# Patient Record
Sex: MALE | Race: WHITE | NOT HISPANIC OR LATINO | Employment: FULL TIME | URBAN - METROPOLITAN AREA
[De-identification: names, ages, dates, MRNs, and addresses within clinical notes are randomized per-mention and may not be internally consistent; named-entity substitution may affect disease eponyms.]

---

## 2023-03-12 ENCOUNTER — HOSPITAL ENCOUNTER (EMERGENCY)
Facility: HOSPITAL | Age: 33
Discharge: HOME | End: 2023-03-13
Attending: EMERGENCY MEDICINE | Admitting: EMERGENCY MEDICINE

## 2023-03-12 ENCOUNTER — APPOINTMENT (EMERGENCY)
Dept: RADIOLOGY | Facility: HOSPITAL | Age: 33
End: 2023-03-12

## 2023-03-12 DIAGNOSIS — F41.9 ANXIETY: Primary | ICD-10-CM

## 2023-03-12 DIAGNOSIS — F45.8 ACUTE HYPERVENTILATION SYNDROME: ICD-10-CM

## 2023-03-12 DIAGNOSIS — E87.6 HYPOKALEMIA: ICD-10-CM

## 2023-03-12 LAB
ANION GAP SERPL CALCULATED.3IONS-SCNC: 14 MMOL/L (ref 4–13)
BASOPHILS # BLD AUTO: 0.06 THOUSANDS/ÂΜL (ref 0–0.1)
BASOPHILS NFR BLD AUTO: 0 % (ref 0–1)
BUN SERPL-MCNC: 15 MG/DL (ref 5–25)
CALCIUM SERPL-MCNC: 9.3 MG/DL (ref 8.4–10.2)
CHLORIDE SERPL-SCNC: 94 MMOL/L (ref 96–108)
CO2 SERPL-SCNC: 21 MMOL/L (ref 21–32)
CREAT SERPL-MCNC: 0.91 MG/DL (ref 0.6–1.3)
EOSINOPHIL # BLD AUTO: 0.05 THOUSAND/ÂΜL (ref 0–0.61)
EOSINOPHIL NFR BLD AUTO: 0 % (ref 0–6)
ERYTHROCYTE [DISTWIDTH] IN BLOOD BY AUTOMATED COUNT: 11.7 % (ref 11.6–15.1)
GFR SERPL CREATININE-BSD FRML MDRD: 111 ML/MIN/1.73SQ M
GLUCOSE SERPL-MCNC: 137 MG/DL (ref 65–140)
HCT VFR BLD AUTO: 38.3 % (ref 36.5–49.3)
HGB BLD-MCNC: 13.7 G/DL (ref 12–17)
IMM GRANULOCYTES # BLD AUTO: 0.09 THOUSAND/UL (ref 0–0.2)
IMM GRANULOCYTES NFR BLD AUTO: 1 % (ref 0–2)
LYMPHOCYTES # BLD AUTO: 1.78 THOUSANDS/ÂΜL (ref 0.6–4.47)
LYMPHOCYTES NFR BLD AUTO: 12 % (ref 14–44)
MCH RBC QN AUTO: 30.4 PG (ref 26.8–34.3)
MCHC RBC AUTO-ENTMCNC: 35.8 G/DL (ref 31.4–37.4)
MCV RBC AUTO: 85 FL (ref 82–98)
MONOCYTES # BLD AUTO: 0.68 THOUSAND/ÂΜL (ref 0.17–1.22)
MONOCYTES NFR BLD AUTO: 5 % (ref 4–12)
NEUTROPHILS # BLD AUTO: 11.82 THOUSANDS/ÂΜL (ref 1.85–7.62)
NEUTS SEG NFR BLD AUTO: 82 % (ref 43–75)
NRBC BLD AUTO-RTO: 0 /100 WBCS
PLATELET # BLD AUTO: 349 THOUSANDS/UL (ref 149–390)
PMV BLD AUTO: 8.8 FL (ref 8.9–12.7)
POTASSIUM SERPL-SCNC: 3 MMOL/L (ref 3.5–5.3)
RBC # BLD AUTO: 4.51 MILLION/UL (ref 3.88–5.62)
SODIUM SERPL-SCNC: 129 MMOL/L (ref 135–147)
WBC # BLD AUTO: 14.48 THOUSAND/UL (ref 4.31–10.16)

## 2023-03-12 RX ORDER — HYDROXYZINE HYDROCHLORIDE 25 MG/1
25 TABLET, FILM COATED ORAL EVERY 6 HOURS PRN
Qty: 12 TABLET | Refills: 0 | Status: SHIPPED | OUTPATIENT
Start: 2023-03-12

## 2023-03-12 RX ORDER — POTASSIUM CHLORIDE 20 MEQ/1
40 TABLET, EXTENDED RELEASE ORAL ONCE
Status: COMPLETED | OUTPATIENT
Start: 2023-03-12 | End: 2023-03-13

## 2023-03-12 RX ORDER — LORAZEPAM 2 MG/ML
1 INJECTION INTRAMUSCULAR ONCE
Status: COMPLETED | OUTPATIENT
Start: 2023-03-12 | End: 2023-03-12

## 2023-03-12 RX ADMIN — LORAZEPAM 1 MG: 2 INJECTION INTRAMUSCULAR; INTRAVENOUS at 22:53

## 2023-03-12 NOTE — Clinical Note
Pedro Arnold was seen and treated in our emergency department on 3/12/2023  No restrictions            Diagnosis:     Media Crank  may return to work on return date  He may return on this date: 03/14/2023         If you have any questions or concerns, please don't hesitate to call        Kemar Tanner MD    ______________________________           _______________          _______________  Hospital Representative                              Date                                Time

## 2023-03-13 VITALS
RESPIRATION RATE: 20 BRPM | TEMPERATURE: 98.1 F | OXYGEN SATURATION: 98 % | DIASTOLIC BLOOD PRESSURE: 70 MMHG | SYSTOLIC BLOOD PRESSURE: 142 MMHG | HEART RATE: 95 BPM

## 2023-03-13 LAB
ATRIAL RATE: 87 BPM
P AXIS: 82 DEGREES
PR INTERVAL: 170 MS
QRS AXIS: 79 DEGREES
QRSD INTERVAL: 96 MS
QT INTERVAL: 350 MS
QTC INTERVAL: 421 MS
T WAVE AXIS: 63 DEGREES
VENTRICULAR RATE: 87 BPM

## 2023-03-13 RX ADMIN — POTASSIUM CHLORIDE 40 MEQ: 1500 TABLET, EXTENDED RELEASE ORAL at 00:16

## 2023-03-13 NOTE — ED PROVIDER NOTES
History  Chief Complaint   Patient presents with   • Anxiety     Pt reports increased anxiety today with palpitations and mild sob  Pt states hx of anxiety but does not take meds at home  Pt also reports mild pain in chest with inspiration, and tingling in arms and legs  Pt reports taking a CBD gummy this morning for anxiety with no relief  Pt tachypneic in triage  57-year-old male presents for evaluation of multiple plaints that started this afternoon  Patient a gradual onset of palpitations, shortness of breath, rapid breathing  Symptoms began gradually, constant, getting progressively worse  He states that he intermittently feels tingliness in his hands and feet and got lightheaded like he was about to pass out  Feels extremely anxious with this  Denies suicidal or homicidal thoughts  No lower extremity or calf pain, no respecters for DVT or PE, no chest pain  Anxiety  Presenting symptoms: no agitation, no hallucinations and no suicidal thoughts    Associated symptoms: anxiety    Associated symptoms: no abdominal pain, no appetite change, no chest pain and no fatigue        None       History reviewed  No pertinent past medical history  History reviewed  No pertinent surgical history  History reviewed  No pertinent family history  I have reviewed and agree with the history as documented  E-Cigarette/Vaping   • E-Cigarette Use Never User      E-Cigarette/Vaping Substances   • Nicotine No    • THC No    • CBD No    • Flavoring No    • Other No    • Unknown No      Social History     Tobacco Use   • Smoking status: Never   • Smokeless tobacco: Never   Vaping Use   • Vaping Use: Never used   Substance Use Topics   • Drug use: Not Currently       Review of Systems   Constitutional: Negative for activity change, appetite change, fatigue and fever  HENT: Negative for congestion, dental problem, ear pain, rhinorrhea and sore throat  Eyes: Negative for pain and redness     Respiratory: Positive for shortness of breath  Negative for chest tightness and wheezing  Cardiovascular: Positive for palpitations  Negative for chest pain  Gastrointestinal: Negative for abdominal pain, blood in stool, constipation, diarrhea, nausea and vomiting  Endocrine: Negative for cold intolerance and heat intolerance  Genitourinary: Negative for dysuria, frequency and hematuria  Musculoskeletal: Negative for arthralgias and myalgias  Skin: Negative for color change, pallor and rash  Neurological: Negative for weakness and numbness  Hematological: Does not bruise/bleed easily  Psychiatric/Behavioral: Negative for agitation, hallucinations and suicidal ideas  The patient is nervous/anxious  The patient is not hyperactive  Physical Exam  Physical Exam  Vitals and nursing note reviewed  Constitutional:       Appearance: Normal appearance  HENT:      Right Ear: External ear normal       Left Ear: External ear normal       Nose: No congestion or rhinorrhea  Mouth/Throat:      Pharynx: No oropharyngeal exudate or posterior oropharyngeal erythema  Eyes:      Extraocular Movements: Extraocular movements intact  Pupils: Pupils are equal, round, and reactive to light  Cardiovascular:      Rate and Rhythm: Normal rate and regular rhythm  Pulmonary:      Effort: Pulmonary effort is normal       Breath sounds: Normal breath sounds  Abdominal:      General: There is no distension  Tenderness: There is no abdominal tenderness  Musculoskeletal:         General: No deformity  Cervical back: Normal range of motion  No rigidity  Right lower leg: No edema  Left lower leg: No edema  Skin:     Capillary Refill: Capillary refill takes less than 2 seconds  Neurological:      General: No focal deficit present  Mental Status: He is alert and oriented to person, place, and time     Psychiatric:         Mood and Affect: Mood normal          Behavior: Behavior normal  Vital Signs  ED Triage Vitals [03/12/23 2219]   Temperature Pulse Respirations Blood Pressure SpO2   98 1 °F (36 7 °C) 83 (!) 24 139/83 100 %      Temp Source Heart Rate Source Patient Position - Orthostatic VS BP Location FiO2 (%)   Oral Monitor Sitting Right arm --      Pain Score       No Pain           Vitals:    03/12/23 2219   BP: 139/83   Pulse: 83   Patient Position - Orthostatic VS: Sitting         Visual Acuity      ED Medications  Medications   potassium chloride (K-DUR,KLOR-CON) CR tablet 40 mEq (has no administration in time range)   LORazepam (ATIVAN) injection 1 mg (1 mg Intravenous Given 3/12/23 2253)       Diagnostic Studies  Results Reviewed     Procedure Component Value Units Date/Time    Basic metabolic panel [[id number]]  (Abnormal) Collected: 03/12/23 2249    Lab Status: Final result Specimen: Blood from Arm, Left Updated: 03/12/23 2319     Sodium 129 mmol/L      Potassium 3 0 mmol/L      Chloride 94 mmol/L      CO2 21 mmol/L      ANION GAP 14 mmol/L      BUN 15 mg/dL      Creatinine 0 91 mg/dL      Glucose 137 mg/dL      Calcium 9 3 mg/dL      eGFR 111 ml/min/1 73sq m     Narrative:      [place] guidelines for Chronic Kidney Disease (CKD):   •  Stage 1 with normal or high GFR (GFR > 90 mL/min/1 73 square meters)  •  Stage 2 Mild CKD (GFR = 60-89 mL/min/1 73 square meters)  •  Stage 3A Moderate CKD (GFR = 45-59 mL/min/1 73 square meters)  •  Stage 3B Moderate CKD (GFR = 30-44 mL/min/1 73 square meters)  •  Stage 4 Severe CKD (GFR = 15-29 mL/min/1 73 square meters)  •  Stage 5 End Stage CKD (GFR <15 mL/min/1 73 square meters)  Note: GFR calculation is accurate only with a steady state creatinine    CBC and differential [[id number]]  (Abnormal) Collected: 03/12/23 2249    Lab Status: Final result Specimen: Blood from Arm, Left Updated: 03/12/23 2259     WBC 14 48 Thousand/uL      RBC 4 51 Million/uL      Hemoglobin 13 7 g/dL      Hematocrit 38 3 %      MCV 85 fL MCH 30 4 pg      MCHC 35 8 g/dL      RDW 11 7 %      MPV 8 8 fL      Platelets 855 Thousands/uL      nRBC 0 /100 WBCs      Neutrophils Relative 82 %      Immat GRANS % 1 %      Lymphocytes Relative 12 %      Monocytes Relative 5 %      Eosinophils Relative 0 %      Basophils Relative 0 %      Neutrophils Absolute 11 82 Thousands/µL      Immature Grans Absolute 0 09 Thousand/uL      Lymphocytes Absolute 1 78 Thousands/µL      Monocytes Absolute 0 68 Thousand/µL      Eosinophils Absolute 0 05 Thousand/µL      Basophils Absolute 0 06 Thousands/µL                  XR chest 2 views   ED Interpretation by Cosmo Pang MD (03/12 2330)   Primary reviewed  No acute normality  Procedures  ECG 12 Lead Documentation Only    Date/Time: 3/12/2023 11:49 PM  Performed by: Cosmo Pang MD  Authorized by: Cosmo Pang MD     ECG reviewed by me, the ED Provider: yes    Patient location:  ED  Rate:     ECG rate:  87    ECG rate assessment: normal    Rhythm:     Rhythm: sinus rhythm    Ectopy:     Ectopy: none    QRS:     QRS axis:  Normal    QRS intervals:  Normal  Conduction:     Conduction: normal    ST segments:     ST segments:  Normal  T waves:     T waves: normal               ED Course  ED Course as of 03/13/23 0016   Sun Mar 12, 2023   2329 Potassium(!): 3 0   2334 Work-up reviewed and benign  Will give patient p o  potassium, discharged home with outpatient follow-up                         PERC Rule for PE    Flowsheet Row Most Recent Value   PERC Rule for PE    Age >=50 0 Filed at: 03/12/2023 2350   HR >=100 0 Filed at: 03/12/2023 2350   O2 Sat on room air < 95% 0 Filed at: 03/12/2023 2350   History of PE or DVT 0 Filed at: 03/12/2023 2350   Recent trauma or surgery 0 Filed at: 03/12/2023 2350   Hemoptysis 0 Filed at: 03/12/2023 2350   Exogenous estrogen 0 Filed at: 03/12/2023 2350   Unilateral leg swelling 0 Filed at: 03/12/2023 2350   PERC Rule for PE Results 0 Filed at: 03/12/2023 2350 SBIRT 20yo+    Flowsheet Row Most Recent Value   SBIRT (23 yo +)    In order to provide better care to our patients, we are screening all of our patients for alcohol and drug use  Would it be okay to ask you these screening questions? Yes Filed at: 03/12/2023 2226   Initial Alcohol Screen: US AUDIT-C     1  How often do you have a drink containing alcohol? 1 Filed at: 03/12/2023 2226   2  How many drinks containing alcohol do you have on a typical day you are drinking? 0 Filed at: 03/12/2023 2226   3a  Male UNDER 65: How often do you have five or more drinks on one occasion? 0 Filed at: 03/12/2023 2226   Audit-C Score 1 Filed at: 03/12/2023 2226   CELESTINE: How many times in the past year have you    Used an illegal drug or used a prescription medication for non-medical reasons? Never Filed at: 03/12/2023 2226                    Medical Decision Making  Multiple complaints most consistent with panic attack  We will do EKG to rule out dysrhythmia, labs for electrolyte normalities, chest x-ray to my/pneumothorax  Patient is PERC negative and does not report of a pulmonary embolism  Amount and/or Complexity of Data Reviewed  Labs: ordered  Decision-making details documented in ED Course  Radiology: ordered and independent interpretation performed  Risk  Prescription drug management            Disposition  Final diagnoses:   Anxiety   Hypokalemia   Acute hyperventilation syndrome     Time reflects when diagnosis was documented in both MDM as applicable and the Disposition within this note     Time User Action Codes Description Comment    3/12/2023 11:34 PM Bonifacio Jesus Add [F41 9] Anxiety     3/12/2023 11:36 PM Roslyn HUERTA Add [E87 6] Hypokalemia     3/12/2023 11:36 PM Bonifacio Jesus Add [F45 8] Acute hyperventilation syndrome       ED Disposition     ED Disposition   Discharge    Condition   Stable    Date/Time   Sun Mar 12, 2023 11:34 PM    Comment   Dmitriy Aguilar discharge to home/self care                Follow-up Information     Follow up With Specialties Details Why Contact Info Additional [facility] Schedule an appointment as soon as possible for a visit in 2 days  [address], [address] [facility] [id number]  [address], [address], [address] [place], [place], [address]          Patient's Medications   Discharge Prescriptions    HYDROXYZINE HCL (ATARAX) 25 MG TABLET    Take 1 tablet (25 mg total) by mouth every 6 (six) hours as needed for anxiety       Start Date: 3/12/2023 End Date: --       Order Dose: 25 mg       Quantity: 12 tablet    Refills: 0       No discharge procedures on file      PDMP Review     None          ED Provider  Electronically Signed by           Jacob Rosales MD  03/13/23 8066

## 2023-03-15 ENCOUNTER — HOSPITAL ENCOUNTER (EMERGENCY)
Facility: HOSPITAL | Age: 33
Discharge: HOME | End: 2023-03-15
Attending: EMERGENCY MEDICINE

## 2023-03-15 VITALS
SYSTOLIC BLOOD PRESSURE: 153 MMHG | HEART RATE: 81 BPM | DIASTOLIC BLOOD PRESSURE: 78 MMHG | WEIGHT: 200.62 LBS | RESPIRATION RATE: 16 BRPM | TEMPERATURE: 98.1 F | OXYGEN SATURATION: 100 %

## 2023-03-15 DIAGNOSIS — F41.9 ANXIETY: Primary | ICD-10-CM

## 2023-03-15 LAB
FLUAV RNA RESP QL NAA+PROBE: NEGATIVE
FLUBV RNA RESP QL NAA+PROBE: NEGATIVE
RSV RNA RESP QL NAA+PROBE: NEGATIVE
SARS-COV-2 RNA RESP QL NAA+PROBE: NEGATIVE

## 2023-03-15 RX ORDER — ALPRAZOLAM 0.5 MG/1
0.5 TABLET ORAL ONCE
Status: COMPLETED | OUTPATIENT
Start: 2023-03-15 | End: 2023-03-15

## 2023-03-15 RX ADMIN — ALPRAZOLAM 0.5 MG: 0.5 TABLET ORAL at 02:45

## 2023-03-15 NOTE — ED NOTES
Pt able to swallow medication and water without difficulty        Charisse Galarza, HALEIGH  03/15/23 3155

## 2023-03-15 NOTE — ED PROVIDER NOTES
History  Chief Complaint   Patient presents with   • Anxiety     Patient reports he awoke feeling anxious and short of breath  Felt like he had difficulty swallowing  Took hydroxyzine earlier around 6 pm      The patient is a 58-year-old male with no past medical history presents to the ED for evaluation after waking up from sleep feeling anxious and short of breath  He also reports feeling shaky upon waking  He reports that he felt as though his mouth was dry, and had difficulty swallowing  He also reports congestion, and is unsure if he had postnasal drip at this time  He was seen in this facility on 3/12 for similar symptoms, during which he was prescribed Atarax  He states that he last took Atarax at 6 PM   Is unsure if he had improvement from it  He denies leg swelling, recent travel, recent surgery, hemoptysis, history of DVT/PE, family history of DVT/PE, chest pain, fever, abdominal pain, nausea, vomiting, diarrhea  Prior to Admission Medications   Prescriptions Last Dose Informant Patient Reported? Taking?   hydrOXYzine HCL (ATARAX) 25 mg tablet   No No   Sig: Take 1 tablet (25 mg total) by mouth every 6 (six) hours as needed for anxiety      Facility-Administered Medications: None       History reviewed  No pertinent past medical history  History reviewed  No pertinent surgical history  History reviewed  No pertinent family history  I have reviewed and agree with the history as documented  E-Cigarette/Vaping   • E-Cigarette Use Never User      E-Cigarette/Vaping Substances   • Nicotine No    • THC No    • CBD No    • Flavoring No    • Other No    • Unknown No      Social History     Tobacco Use   • Smoking status: Never   • Smokeless tobacco: Never   Vaping Use   • Vaping Use: Never used   Substance Use Topics   • Drug use: Not Currently       Review of Systems   Constitutional: Negative for chills and fever  HENT: Positive for congestion, ear pain and trouble swallowing  Negative for rhinorrhea and sore throat  Respiratory: Positive for shortness of breath  Negative for cough  Cardiovascular: Negative for chest pain and leg swelling  Gastrointestinal: Negative for abdominal pain, constipation, diarrhea, nausea and vomiting  Genitourinary: Negative for dysuria and flank pain  Musculoskeletal: Negative for arthralgias and myalgias  Skin: Negative for rash and wound  Neurological: Negative for dizziness, weakness, numbness and headaches  Psychiatric/Behavioral: Negative for behavioral problems  The patient is nervous/anxious  Physical Exam  Physical Exam  Vitals and nursing note reviewed  Constitutional:       General: He is not in acute distress  Appearance: He is well-developed  He is not ill-appearing or toxic-appearing  HENT:      Head: Normocephalic and atraumatic  Right Ear: Tympanic membrane, ear canal and external ear normal       Left Ear: Tympanic membrane, ear canal and external ear normal       Mouth/Throat:      Mouth: Mucous membranes are moist  No angioedema  Pharynx: Oropharynx is clear  Uvula midline  No posterior oropharyngeal erythema or uvula swelling  Eyes:      Conjunctiva/sclera: Conjunctivae normal    Cardiovascular:      Rate and Rhythm: Normal rate and regular rhythm  Heart sounds: No murmur heard  Pulmonary:      Effort: Pulmonary effort is normal  No respiratory distress  Breath sounds: Normal breath sounds  Abdominal:      Palpations: Abdomen is soft  Tenderness: There is no abdominal tenderness  Musculoskeletal:         General: No swelling  Cervical back: Neck supple  Skin:     General: Skin is warm and dry  Capillary Refill: Capillary refill takes less than 2 seconds  Neurological:      Mental Status: He is alert  Sensory: No sensory deficit  Motor: No weakness     Psychiatric:         Mood and Affect: Mood normal          Vital Signs  ED Triage Vitals   Temperature Pulse Respirations Blood Pressure SpO2   03/15/23 0220 03/15/23 0217 03/15/23 0217 03/15/23 0217 03/15/23 0217   98 1 °F (36 7 °C) 81 16 153/78 100 %      Temp Source Heart Rate Source Patient Position - Orthostatic VS BP Location FiO2 (%)   03/15/23 0220 -- -- -- --   Oral          Pain Score       03/15/23 0217       No Pain           Vitals:    03/15/23 0217   BP: 153/78   Pulse: 81         Visual Acuity      ED Medications  Medications   ALPRAZolam (XANAX) tablet 0 5 mg (0 5 mg Oral Given 3/15/23 0245)       Diagnostic Studies  Results Reviewed     Procedure Component Value Units Date/Time    FLU/RSV/COVID - if FLU/RSV clinically relevant [[id number]]  (Normal) Collected: 03/15/23 0243    Lab Status: Final result Specimen: Nares from Nose Updated: 03/15/23 0333     SARS-CoV-2 Negative     INFLUENZA A PCR Negative     INFLUENZA B PCR Negative     RSV PCR Negative    Narrative:      FOR PEDIATRIC PATIENTS - copy/paste COVID Guidelines URL to browser: [email or web address]  [email or web address]    SARS-CoV-2 assay is a Nucleic Acid Amplification assay intended for the  qualitative detection of nucleic acid from SARS-CoV-2 in nasopharyngeal  swabs  Results are for the presumptive identification of SARS-CoV-2 RNA  Positive results are indicative of infection with SARS-CoV-2, the virus  causing COVID-19, but do not rule out bacterial infection or co-infection  with other viruses  Laboratories within the [place] and its  territories are required to report all positive results to the appropriate  public health authorities  Negative results do not preclude SARS-CoV-2  infection and should not be used as the sole basis for treatment or other  patient management decisions  Negative results must be combined with  clinical observations, patient history, and epidemiological information  This test has not been FDA cleared or approved      This test has been authorized by FDA under an Emergency Use Authorization  (EUA)  This test is only authorized for the duration of time the  declaration that circumstances exist justifying the authorization of the  emergency use of an in vitro diagnostic tests for detection of SARS-CoV-2  virus and/or diagnosis of COVID-19 infection under section 564(b)(1) of  the Act, 21 U  S C  303UWN-5(B)(8), unless the authorization is terminated  or revoked sooner  The test has been validated but independent review by FDA  and CLIA is pending  Test performed using [employer] Gene[employer]pert: This RT-PCR assay targets N2,  a region unique to SARS-CoV-2  A conserved region in the E-gene was chosen  for pan-Sarbecovirus detection which includes SARS-CoV-2  According to CMS-2020-01-R, this platform meets the definition of high-throughput technology  No orders to display              Procedures  Procedures         ED Course  ED Course as of 03/16/23 0355   Wed Mar 15, 2023   0334 SARS-COV-2: Negative           Medical Decision Making  The patient is a 40-year-old male presents to the ED for evaluation after waking up from sleep feeling anxious, shakey, and short of breath  On exam, the patient is in no acute distress  He is anxious appearing  VSS  HRRR  Lungs CTA  Strength 5/5 in all extremities, sensation grossly intact  Work-up from previous visit reviewed  Labs within normal limits  Chest x-ray, EKG were noted to be unremarkable  Given previous work-up, will attempt to treat patient prior to additional work-up  Will trial Xanax, obtain COVID test per patient request, and re-examine  On reexamination, patient reports significant improvement in symptoms  Will discharge with PCP follow-up  At the time of discharge, the patient is in no acute distress   I discussed with the patient the diagnosis, treatment plan, follow-up, return precautions, and discharge instructions; they were given the opportunity to ask questions and verbalized understanding  Anxiety: acute illness or injury  Amount and/or Complexity of Data Reviewed  External Data Reviewed: labs, radiology, ECG and notes  Labs: ordered  Decision-making details documented in ED Course  Risk  Prescription drug management  Disposition  Final diagnoses:   Anxiety     Time reflects when diagnosis was documented in both MDM as applicable and the Disposition within this note     Time User Action Codes Description Comment    3/15/2023  3:55 AM Agustin Walden Add [F41 9] Anxiety       ED Disposition     ED Disposition   Discharge    Condition   Stable    Date/Time   Wed Mar 15, 2023  3:55 AM    Comment   Zen Goss discharge to home/self care                 Follow-up Information    None         Discharge Medication List as of 3/15/2023  3:55 AM      CONTINUE these medications which have NOT CHANGED    Details   hydrOXYzine HCL (ATARAX) 25 mg tablet Take 1 tablet (25 mg total) by mouth every 6 (six) hours as needed for anxiety, Starting Sun 3/12/2023, Print                 PDMP Review     None          ED Provider  Electronically Signed by           Jane Martins PA-C  03/16/23 4806

## 2023-03-15 NOTE — DISCHARGE INSTRUCTIONS
Return for chest pain, trouble breathing, leg swelling, coughing up blood,passing out, or any other new/concerning symptoms     Follow up with primary care as discussed

## 2023-04-07 ENCOUNTER — OFFICE VISIT (OUTPATIENT)
Dept: FAMILY MEDICINE CLINIC | Facility: CLINIC | Age: 33
End: 2023-04-07

## 2023-04-07 VITALS
SYSTOLIC BLOOD PRESSURE: 154 MMHG | WEIGHT: 200.2 LBS | HEART RATE: 98 BPM | DIASTOLIC BLOOD PRESSURE: 68 MMHG | BODY MASS INDEX: 27.12 KG/M2 | TEMPERATURE: 98.9 F | HEIGHT: 72 IN

## 2023-04-07 DIAGNOSIS — F41.9 ANXIETY AND DEPRESSION: Primary | ICD-10-CM

## 2023-04-07 DIAGNOSIS — F32.A ANXIETY AND DEPRESSION: Primary | ICD-10-CM

## 2023-04-07 DIAGNOSIS — Z13.220 LIPID SCREENING: ICD-10-CM

## 2023-04-07 RX ORDER — SERTRALINE HYDROCHLORIDE 25 MG/1
25 TABLET, FILM COATED ORAL DAILY
Qty: 30 TABLET | Refills: 5 | Status: SHIPPED | OUTPATIENT
Start: 2023-04-07 | End: 2023-10-04

## 2023-04-07 NOTE — ASSESSMENT & PLAN NOTE
Without SI or HI initiate Zoloft 25 mg once daily check blood work return to office in 6 weeks  Pt  instructed to take 1/2 tab daily for the first week and then increase to full tab thereafter  Patient does have Atarax to use as needed for now  Work-up in emergency room negative including chest x-ray EKG however blood work showed low electrolytes in general and elevated white count  Will repeat CBC CMP TSH and add lipid for screening purposes    Patient declining therapy at this time

## 2023-04-07 NOTE — PROGRESS NOTES
Assessment and Plan:     Problem List Items Addressed This Visit    None       Preventive health issues were discussed with patient, and age appropriate screening tests were ordered as noted in patient's After Visit Summary  Personalized health advice and appropriate referrals for health education or preventive services given if needed, as noted in patient's After Visit Summary  History of Present Illness:     Patient presents for a Medicare Wellness Visit    HPI   Patient Care Team:  Juan Antonio Leavitt PA-C as PCP - General (Family Medicine)     Review of Systems:     Review of Systems     Problem List:     There is no problem list on file for this patient  Past Medical and Surgical History:     Past Medical History:   Diagnosis Date   • Anxiety    • Depression    • OCD (obsessive compulsive disorder)      Past Surgical History:   Procedure Laterality Date   • NO PAST SURGERIES        Family History:     Family History   Problem Relation Age of Onset   • No Known Problems Mother    • No Known Problems Father    • Depression Sister    • Anxiety disorder Sister    • Diabetes Maternal Grandmother       Social History:     Social History     Socioeconomic History   • Marital status: Single     Spouse name: None   • Number of children: None   • Years of education: None   • Highest education level: None   Occupational History   • None   Tobacco Use   • Smoking status: Never   • Smokeless tobacco: Never   Vaping Use   • Vaping Use: Never used   Substance and Sexual Activity   • Alcohol use: None     Comment: Occasionally   • Drug use: Not Currently   • Sexual activity: None   Other Topics Concern   • None   Social History Narrative   • None     Social Determinants of Health     Financial Resource Strain: Low Risk    • Difficulty of Paying Living Expenses: Not hard at all   Food Insecurity: Not on file   Transportation Needs: No Transportation Needs   • Lack of Transportation (Medical):  No   • Lack of Transportation (Non-Medical): No   Physical Activity: Not on file   Stress: Not on file   Social Connections: Not on file   Intimate Partner Violence: Not on file   Housing Stability: Not on file      Medications and Allergies:     Current Outpatient Medications   Medication Sig Dispense Refill   • hydrOXYzine HCL (ATARAX) 25 mg tablet Take 1 tablet (25 mg total) by mouth every 6 (six) hours as needed for anxiety (Patient not taking: Reported on 4/7/2023) 12 tablet 0     No current facility-administered medications for this visit  No Known Allergies   Immunizations: There is no immunization history on file for this patient  Health Maintenance:         Topic Date Due   • Hepatitis C Screening  Never done   • HIV Screening  Never done         Topic Date Due   • COVID-19 Vaccine (1) Never done   • Influenza Vaccine (1) Never done      Medicare Screening Tests and Risk Assessments:     Paloma Zarate is here for his Initial Wellness visit  Health Risk Assessment:   Patient rates overall health as good  Patient feels that their physical health rating is slightly worse  Patient is satisfied with their life  Eyesight was rated as same  Hearing was rated as same  Patient feels that their emotional and mental health rating is slightly worse  Patients states they are never, rarely angry  Patient states they are sometimes unusually tired/fatigued  Pain experienced in the last 7 days has been none  Fall Risk Screening: In the past year, patient has experienced: no history of falling in past year      Home Safety:  Patient does not have trouble with stairs inside or outside of their home  Patient has working smoke alarms and has working carbon monoxide detector  Home safety hazards include: none  Nutrition:   Current diet is Regular  Medications:   Patient is currently taking over-the-counter supplements  OTC medications include: see medication list  Patient is able to manage medications       Activities of "Daily Living (ADLs)/Instrumental Activities of Daily Living (IADLs):   Walk and transfer into and out of bed and chair?: Yes  Dress and groom yourself?: Yes    Bathe or shower yourself?: Yes    Feed yourself? Yes  Do your laundry/housekeeping?: Yes  Manage your money, pay your bills and track your expenses?: Yes  Make your own meals?: Yes    Do your own shopping?: Yes    PREVENTIVE SCREENINGS        Diabetes Screening:     General: Screening Current      Prostate Cancer Screening:    General: Screening Not Indicated      Lung Cancer Screening:     General: Screening Not Indicated    Screening, Brief Intervention, and Referral to Treatment (SBIRT)    Screening  Typical number of drinks in a day: 0  Typical number of drinks in a week: 0  Interpretation: Low risk drinking behavior  Single Item Drug Screening:  How often have you used an illegal drug (including marijuana) or a prescription medication for non-medical reasons in the past year? never    Single Item Drug Screen Score: 0  Interpretation: Negative screen for possible drug use disorder    No results found       Physical Exam:     Pulse 98   Temp 98 9 °F (37 2 °C) (Temporal)   Ht 5' 11 75\" (1 822 m)   Wt 90 8 kg (200 lb 3 2 oz)   BMI 27 34 kg/m²     Physical Exam     Isabella Mei PA-C  "

## 2023-04-07 NOTE — PATIENT INSTRUCTIONS
1  Anxiety and depression  Assessment & Plan: Without SI or HI initiate Zoloft 25 mg once daily check blood work return to office in 6 weeks  Pt  instructed to take 1/2 tab daily for the first week and then increase to full tab thereafter  Patient does have Atarax to use as needed for now  Work-up in emergency room negative including chest x-ray EKG however blood work showed low electrolytes in general and elevated white count  Will repeat CBC CMP TSH and add lipid for screening purposes  Patient declining therapy at this time    Orders:  -     CBC and differential  -     Comprehensive metabolic panel  -     TSH, 3rd generation with Free T4 reflex  -     sertraline (ZOLOFT) 25 mg tablet; Take 1 tablet (25 mg total) by mouth daily    2   Lipid screening  -     Lipid panel

## 2023-04-07 NOTE — PROGRESS NOTES
Name: Nicole Barnes      : 1990      MRN: [id number]  Encounter Provider: Halima Figueroa PA-C  Encounter Date: 2023   Encounter department: [facility]    Assessment & Plan     1  Anxiety and depression  Assessment & Plan: Without SI or HI initiate Zoloft 25 mg once daily check blood work return to office in 6 weeks  Pt  instructed to take 1/2 tab daily for the first week and then increase to full tab thereafter  Patient does have Atarax to use as needed for now  Work-up in emergency room negative including chest x-ray EKG however blood work showed low electrolytes in general and elevated white count  Will repeat CBC CMP TSH and add lipid for screening purposes  Patient declining therapy at this time    Orders:  -     CBC and differential  -     Comprehensive metabolic panel  -     TSH, 3rd generation with Free T4 reflex  -     sertraline (ZOLOFT) 25 mg tablet; Take 1 tablet (25 mg total) by mouth daily    2  Lipid screening  -     Lipid panel      BMI Counseling: Body mass index is 27 34 kg/m²  The BMI is above normal  Nutrition recommendations include decreasing portion sizes, encouraging healthy choices of fruits and vegetables, decreasing fast food intake, consuming healthier snacks, limiting drinks that contain sugar, moderation in carbohydrate intake, increasing intake of lean protein, reducing intake of saturated and trans fat and reducing intake of cholesterol  Exercise recommendations include exercising 3-5 times per week  Rationale for BMI follow-up plan is due to patient being overweight or obese  Subjective      New patient here today to be assessed for his anxiety and depression which has been significantly severe since just before high school although he has never sought treatment counseling or any type of evaluation for this problem  He is here today because he thinks it is time that he address his anxiety more so    He states that he thinks his anxiety kind of causes his depression no SI or HI  Patient states that his sister sees me and had a similar situation and is doing great on Zoloft  He is very concerned about sexual libido and issues with decrease in this with treatment options  He has a list of 5 medications that he would like to discuss including Wellbutrin Remeron Viibryd Trintellix and Zoloft  This visit was prompted by the fact that earlier last month he ended up going to the emergency room twice because he was diagnosed with panic attacks and thought he was having a heart attack  X-ray within normal limits EKG within normal limits patient states that he had taken a workout enhancement supplement prior to these 2 episodes which was also making him vomit and therefore his CBC had an elevated white count and his electrolytes were low including potassium of 3  Did receive IV electrolytes  Patient was given Atarax to use as needed at home and patient states that he is only taking it twice and it worked just a little bit for him  Patient works full-time in a warehouse and does a lot of walking done and steps in  Patient states he is really not interested in therapy at this time  Patient also admits to having problems falling asleep and staying asleep has obsessive thoughts that he just cannot stop  Review of Systems   Constitutional: Negative  HENT: Negative  Eyes: Negative  Respiratory: Negative  Cardiovascular: Negative  Gastrointestinal: Negative  Endocrine: Negative  Genitourinary: Negative  Musculoskeletal: Negative  Skin: Negative  Allergic/Immunologic: Negative  Neurological: Negative  Hematological: Negative  Psychiatric/Behavioral: Positive for dysphoric mood and sleep disturbance  The patient is nervous/anxious          Current Outpatient Medications on File Prior to Visit   Medication Sig   • hydrOXYzine HCL (ATARAX) 25 mg tablet Take 1 tablet (25 mg total) by mouth every 6 (six) hours as "needed for anxiety (Patient not taking: Reported on 4/7/2023)       Objective     /68 (BP Location: Right arm, Patient Position: Sitting, Cuff Size: Adult)   Pulse 98   Temp 98 9 °F (37 2 °C) (Temporal)   Ht 5' 11 75\" (1 822 m)   Wt 90 8 kg (200 lb 3 2 oz)   BMI 27 34 kg/m²     Physical Exam  Vitals and nursing note reviewed  Constitutional:       General: He is not in acute distress  Appearance: He is well-developed  He is not diaphoretic  HENT:      Head: Normocephalic and atraumatic  Eyes:      General:         Right eye: No discharge  Left eye: No discharge  Conjunctiva/sclera: Conjunctivae normal    Neck:      Vascular: No carotid bruit  Cardiovascular:      Rate and Rhythm: Normal rate and regular rhythm  Heart sounds: Normal heart sounds  No murmur heard  No friction rub  No gallop  Pulmonary:      Effort: Pulmonary effort is normal  No respiratory distress  Breath sounds: Normal breath sounds  No wheezing or rales  Skin:     General: Skin is warm and dry  Neurological:      Mental Status: He is alert and oriented to person, place, and time  Psychiatric:         Attention and Perception: Attention normal          Mood and Affect: Mood is anxious  Speech: Speech is rapid and pressured  Behavior: Behavior normal          Thought Content:  Thought content normal          Cognition and Memory: Cognition normal          Judgment: Judgment normal        Dank Crump PA-C  "

## 2023-10-03 DIAGNOSIS — F41.9 ANXIETY AND DEPRESSION: ICD-10-CM

## 2023-10-03 DIAGNOSIS — F32.A ANXIETY AND DEPRESSION: ICD-10-CM

## 2023-10-03 RX ORDER — SERTRALINE HYDROCHLORIDE 25 MG/1
25 TABLET, FILM COATED ORAL DAILY
Qty: 30 TABLET | Refills: 5 | Status: SHIPPED | OUTPATIENT
Start: 2023-10-03 | End: 2024-03-31